# Patient Record
(demographics unavailable — no encounter records)

---

## 2025-05-15 NOTE — END OF VISIT
[Time Spent: ___ minutes] : I have spent [unfilled] minutes of time on the encounter which excludes teaching and separately reported services. [FreeTextEntry3] : I, Dr. Hermes Baez, personally performed the evaluation and management (E/M) services for this new patient. That E/M includes conducting the clinically appropriate initial history &/or exam, assessing all conditions, and establishing the plan of care. Today, my HUSSEIN, Leana Abarca, PNP], was here to observe my evaluation and management service for this patient & follow plan of care established by me going forward.

## 2025-05-15 NOTE — DATA REVIEWED
[FreeTextEntry1] : I personally reviewed records, documentation and images (findings included into my note), by and including: - Sachi Zavala (MD) dated 5/25/2024. - *5/18/2024 Chest Xray reviewed, noting normal lungs -- My Own Interpretation --

## 2025-05-15 NOTE — PHYSICAL EXAM
[Well Groomed] : well groomed [Alert] : ~L alert [Active] : active [Normal Breathing Pattern] : normal breathing pattern [No Respiratory Distress] : no respiratory distress [Non-Erythematous] : non-erythematous [Good aeration to bases] : good aeration to bases [Equal Breath Sounds] : equal breath sounds bilaterally [No Crackles] : no crackles [No Rhonchi] : no rhonchi [No Wheezing] : no wheezing [Soft, Non-Tender] : soft, non-tender [Full ROM] : full range of motion [No Contractures] : no contractures [No Rashes] : no rashes [Absence Of Retractions] : absence of retractions [Symmetric] : symmetric [Good Expansion] : good expansion [No Acc Muscle Use] : no accessory muscle use [Alert and  Oriented] : alert and oriented [No Abnormal Focal Findings] : no abnormal focal findings [Normal Muscle Tone And Reflexes] : normal muscle tone and reflexes [No Birth Marks] : no birth marks [FreeTextEntry1] : small for age [FreeTextEntry2] : greg philip/l  [FreeTextEntry3] : impacted b/l  [FreeTextEntry4] : +nasal congestion [FreeTextEntry8] : S1S2 [de-identified] : age appropriate

## 2025-05-15 NOTE — PHYSICAL EXAM
[Well Groomed] : well groomed [Alert] : ~L alert [Active] : active [Normal Breathing Pattern] : normal breathing pattern [No Respiratory Distress] : no respiratory distress [Non-Erythematous] : non-erythematous [Good aeration to bases] : good aeration to bases [Equal Breath Sounds] : equal breath sounds bilaterally [No Crackles] : no crackles [No Rhonchi] : no rhonchi [No Wheezing] : no wheezing [Soft, Non-Tender] : soft, non-tender [Full ROM] : full range of motion [No Contractures] : no contractures [No Rashes] : no rashes [Absence Of Retractions] : absence of retractions [Symmetric] : symmetric [Good Expansion] : good expansion [No Acc Muscle Use] : no accessory muscle use [Alert and  Oriented] : alert and oriented [No Abnormal Focal Findings] : no abnormal focal findings [Normal Muscle Tone And Reflexes] : normal muscle tone and reflexes [No Birth Marks] : no birth marks [FreeTextEntry1] : small for age [FreeTextEntry2] : greg philip/l  [FreeTextEntry3] : impacted b/l  [FreeTextEntry4] : +nasal congestion [FreeTextEntry8] : S1S2 [de-identified] : age appropriate

## 2025-05-15 NOTE — CONSULT LETTER
[Dear  ___] : Dear  [unfilled], [Consult Letter:] : I had the pleasure of evaluating your patient, [unfilled]. [Please see my note below.] : Please see my note below. [Consult Closing:] : Thank you very much for allowing me to participate in the care of this patient.  If you have any questions, please do not hesitate to contact me. [Sincerely,] : Sincerely, [FreeTextEntry3] : Hermes Albert MD Attending Physician, Division of Pediatric Pulmonology.

## 2025-05-15 NOTE — REVIEW OF SYSTEMS
[NI] : Genitourinary  [Nl] : Endocrine [Tachypnea] : tachypneic [Wheezing] : wheezing [Cough] : cough

## 2025-05-15 NOTE — HISTORY OF PRESENT ILLNESS
[FreeTextEntry1] : 2yr old male child presents for evaluation of RAD. CXR 5/18/2024: normal.  INITIAL VISIT 5/7/2025 -Admitted in 5/19/24 to [facility] at [facility] [place] requiring HFNC 20L in setting of R/E, +wheezing, tracheal tug, RR 60s, d/c on fluticasone propionate 44 2 puffs BID but mother did not use  -Seen in [facility] ED x 3 this yea in March, April, and May requiring nebs and OCS each visit -last visit 5/3/2025 -Still has runny nose and cough improving -Parents endorses SOB with running -PCP started pt on fluticasone propionate 44. -Will start  Sept 2025. -History of adenovirus and R/E in March 2024  - PMH/PSH:  denies - Birth Hx: FT, NVSD- denies complications - Meds:  fluticasone propionate 44 2 puffs BID - started 2 days ago  - Respiratory symptoms when well: denies - Bacterial infections/Antibiotics: denies - Hospitalizations/ER visits: see HPI - Albuterol use: yes - Steroids use: yes - Reflux/Aspiration symptoms: denies - Vaccination status: yes - Sleep symptoms:  denies  - [facility] asthma:  mother w/ asthma, uses albuterol PRN - Allergies:  dust mite - Eczema: denies - Social/Environmental Hx: lives with parents and 3 brothers, no pets, no smokers, no  will start 9/2024     ___________________________________________________________________________________          Asthma medications and treatment frequency and spirometry are part of the patient's ongoing plan of care.

## 2025-05-15 NOTE — HISTORY OF PRESENT ILLNESS
[FreeTextEntry1] : 2yr old male child presents for evaluation of RAD. CXR 5/18/2024: normal.  INITIAL VISIT 5/7/2025 -[place] [facility] [place] [facility] [place] 20[place] in setting of R/E, +wheezing, tracheal tug, RR 60s, d/c on fluticasone propionate 44 2 puffs BID but mother did not use  -[place] [facility] [place] this yea in March, April, and May requiring nebs and OCS each visit -last visit 5/3/2025 -Still has runny nose and cough improving -Parents endorses SOB with running -PCP started pt on fluticasone propionate 44. -Will start  Sept 2025. -History of adenovirus and R/E in March 2024  - PMH/PSH:  denies - Birth Hx: FT, NVSD- denies complications - Meds:  fluticasone propionate 44 2 puffs BID - started 2 days ago  - Respiratory symptoms when well: denies - Bacterial infections/Antibiotics: denies - Hospitalizations/ER visits: see HPI - Albuterol use: yes - Steroids use: yes - Reflux/Aspiration symptoms: denies - Vaccination status: yes - Sleep symptoms:  denies  - [facility] asthma:  mother w/ asthma, uses albuterol PRN - Allergies:  dust mite - Eczema: denies - Social/Environmental Hx: lives with parents and 3 brothers, no pets, no smokers, no  will start 9/2024     ___________________________________________________________________________________          Asthma medications and treatment frequency and spirometry are part of the patient's ongoing plan of care.